# Patient Record
Sex: FEMALE | Race: WHITE | NOT HISPANIC OR LATINO | Employment: STUDENT | URBAN - METROPOLITAN AREA
[De-identification: names, ages, dates, MRNs, and addresses within clinical notes are randomized per-mention and may not be internally consistent; named-entity substitution may affect disease eponyms.]

---
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
## 2021-03-27 PROCEDURE — 99283 EMERGENCY DEPT VISIT LOW MDM: CPT

## 2021-03-28 ENCOUNTER — APPOINTMENT (EMERGENCY)
Dept: RADIOLOGY | Facility: HOSPITAL | Age: 21
End: 2021-03-28
Attending: EMERGENCY MEDICINE
Payer: COMMERCIAL

## 2021-03-28 ENCOUNTER — HOSPITAL ENCOUNTER (EMERGENCY)
Facility: HOSPITAL | Age: 21
Discharge: HOME | End: 2021-03-28
Attending: EMERGENCY MEDICINE | Admitting: EMERGENCY MEDICINE
Payer: COMMERCIAL

## 2021-03-28 VITALS
WEIGHT: 119 LBS | DIASTOLIC BLOOD PRESSURE: 83 MMHG | HEART RATE: 112 BPM | SYSTOLIC BLOOD PRESSURE: 134 MMHG | TEMPERATURE: 97.7 F | OXYGEN SATURATION: 97 % | RESPIRATION RATE: 18 BRPM

## 2021-03-28 DIAGNOSIS — S93.409A ANKLE SPRAIN: Primary | ICD-10-CM

## 2021-03-28 PROCEDURE — 73630 X-RAY EXAM OF FOOT: CPT

## 2021-03-28 PROCEDURE — 73610 X-RAY EXAM OF ANKLE: CPT

## 2021-03-28 PROCEDURE — 99282 EMERGENCY DEPT VISIT SF MDM: CPT | Performed by: EMERGENCY MEDICINE

## 2021-03-28 RX ORDER — IBUPROFEN 600 MG/1
600 TABLET ORAL ONCE
Status: DISCONTINUED | OUTPATIENT
Start: 2021-03-28 | End: 2021-03-28 | Stop reason: HOSPADM

## 2021-03-28 NOTE — ED PROVIDER NOTES
History  Chief Complaint   Patient presents with    Ankle Injury     Patient c/o b/l ankle and foot swelling and pain from fall  Patient fell down 3 steps  Mechanical fall  No head injury or LOC  HPI    23 yo female who presents with ankle injury  Patient states she tripped and fell and has pain in both ankles worse on left than right  Patient denies any foot pain  Patient denies hitting head no loss of conscious  23 yo female who presents with bilateral ankle pain  Will get x-rays    Prior to Admission Medications   Prescriptions Last Dose Informant Patient Reported? Taking? Norethin Ace-Eth Estrad-FE (BLISOVI 24 FE PO)   Yes No   Sig: Take by mouth      Facility-Administered Medications: None       Past Medical History:   Diagnosis Date    ADHD        History reviewed  No pertinent surgical history  History reviewed  No pertinent family history  I have reviewed and agree with the history as documented  E-Cigarette/Vaping    E-Cigarette Use Never User      E-Cigarette/Vaping Substances     Social History     Tobacco Use    Smoking status: Never Smoker    Smokeless tobacco: Never Used   Substance Use Topics    Alcohol use: Yes     Comment: occasional    Drug use: Never       Review of Systems   Constitutional: Negative  Negative for diaphoresis and fever  HENT: Negative  Respiratory: Negative  Negative for cough, shortness of breath and wheezing  Cardiovascular: Negative  Negative for chest pain, palpitations and leg swelling  Gastrointestinal: Negative for abdominal distention, abdominal pain, nausea and vomiting  Genitourinary: Negative  Musculoskeletal:        Bilateral ankle pain   Skin: Negative  Neurological: Negative  Psychiatric/Behavioral: Negative  All other systems reviewed and are negative  Physical Exam  Physical Exam  Vitals signs and nursing note reviewed  Constitutional:       General: She is not in acute distress       Appearance: She is well-developed  HENT:      Head: Normocephalic and atraumatic  Eyes:      Conjunctiva/sclera: Conjunctivae normal    Neck:      Musculoskeletal: Neck supple  Cardiovascular:      Rate and Rhythm: Normal rate and regular rhythm  Heart sounds: No murmur  Pulmonary:      Effort: Pulmonary effort is normal  No respiratory distress  Breath sounds: Normal breath sounds  Abdominal:      Palpations: Abdomen is soft  Tenderness: There is no abdominal tenderness  Musculoskeletal:         General: Swelling present  Comments: Some swelling around the left lateral malleolus  Tenderness to palpation  Normal range of motion  Normal pulses  No pain and the upper leg or knee  Skin:     General: Skin is warm and dry  Neurological:      Mental Status: She is alert  Vital Signs  ED Triage Vitals [03/28/21 0005]   Temperature Pulse Respirations Blood Pressure SpO2   97 7 °F (36 5 °C) (!) 112 18 134/83 97 %      Temp Source Heart Rate Source Patient Position - Orthostatic VS BP Location FiO2 (%)   Tympanic Monitor Sitting Right arm --      Pain Score       Worst Possible Pain           Vitals:    03/28/21 0005   BP: 134/83   Pulse: (!) 112   Patient Position - Orthostatic VS: Sitting         Visual Acuity      ED Medications  Medications - No data to display    Diagnostic Studies  Results Reviewed     None                 XR ankle 3+ views LEFT    (Results Pending)   XR foot 3+ views LEFT    (Results Pending)   XR ankle 3+ views RIGHT    (Results Pending)   XR foot 3+ views RIGHT    (Results Pending)              Procedures  Procedures         ED Course         MO      Most Recent Value   SBIRT (13-21 yo)   In order to provide better care to our patients, we are screening all of our patients for alcohol and drug use  Would it be okay to ask you these screening questions? No Filed at: 03/28/2021 0019                                        MDM    Disposition  Final diagnoses:    Ankle sprain     Time reflects when diagnosis was documented in both MDM as applicable and the Disposition within this note     Time User Action Codes Description Comment    3/28/2021 12:57 AM Nely Pedersen [U57 [id number]] Ankle sprain       ED Disposition     ED Disposition Condition Date/Time Comment    Discharge Stable Sun Mar 28, 2021 12:57 AM Jeff Mena discharge to home/self care  Follow-up Information     Follow up With Specialties Details Why Contact Info    Luis Vaughn   As needed [address]            Discharge Medication List as of 3/28/2021  1:00 AM      CONTINUE these medications which have NOT CHANGED    Details   Norethin Ace-Eth Estrad-FE (BLISOVI 24 FE PO) Take by mouth, Historical Med           No discharge procedures on file      PDMP Review     None          ED Provider  Electronically Signed by           Earl Hastings MD  03/28/21 5004

## 2021-04-13 DIAGNOSIS — Z23 ENCOUNTER FOR IMMUNIZATION: ICD-10-CM

## 2021-04-23 ENCOUNTER — OFFICE VISIT (OUTPATIENT)
Dept: OBGYN CLINIC | Facility: CLINIC | Age: 21
End: 2021-04-23
Payer: COMMERCIAL

## 2021-04-23 VITALS
DIASTOLIC BLOOD PRESSURE: 70 MMHG | HEART RATE: 82 BPM | WEIGHT: 119 LBS | SYSTOLIC BLOOD PRESSURE: 110 MMHG | HEIGHT: 64 IN | BODY MASS INDEX: 20.32 KG/M2

## 2021-04-23 DIAGNOSIS — S93.491A SPRAIN OF ANTERIOR TALOFIBULAR LIGAMENT OF RIGHT ANKLE, INITIAL ENCOUNTER: Primary | ICD-10-CM

## 2021-04-23 DIAGNOSIS — S93.492A SPRAIN OF ANTERIOR TALOFIBULAR LIGAMENT OF LEFT ANKLE, INITIAL ENCOUNTER: ICD-10-CM

## 2021-04-23 PROCEDURE — 99203 OFFICE O/P NEW LOW 30 MIN: CPT | Performed by: ORTHOPAEDIC SURGERY

## 2021-04-23 RX ORDER — DEXTROAMPHETAMINE SACCHARATE, AMPHETAMINE ASPARTATE, DEXTROAMPHETAMINE SULFATE AND AMPHETAMINE SULFATE 5; 5; 5; 5 MG/1; MG/1; MG/1; MG/1
1 TABLET ORAL DAILY
COMMUNITY
Start: 2021-03-03

## 2021-04-23 NOTE — PATIENT INSTRUCTIONS
You have sprained your ankle  Over the next few weeks, it is important to follow these instructions to prevent long term complications from this sprain  1  Begin PT immediately  2  Obtain a compression stocking  Knee high (20-30mm Hg pressure)  Wear this at all times while awake to help control swelling  3  NSAIDs as needed for anti-inflammatory effects  You have sprained your ankle  Over the next 4 weeks it is important you follow these instructions;     Lateral Ankle Sprain Protocol - [facility]  Acute Phase: Days 1-3  Goals: Decrease pain and swelling, protect from further injury  · Pain and swelling management (RICE)  · Protection of injured ligaments (activity modification, supportive sneakers, occasionally a boot is necessary for a week or two at most)  · Gait-WBAT  Sub-Acute Phase: 2-4 days to 2 weeks  Goals: Decrease/eliminate pain, increase ROM, decrease swelling, increase strength  · Continue pain and swelling management  · Subtalar and talocrurcal joint mobilizations  · ROM with pain-free range: DF/PF/EV/IV AROM, calf stretching  · Isometric strengthening  Rehabilitative Phase: 2-6 weeks  Goals: Regain ROM and strength, increase endurance and proprioception  · Continue joint mobilizations and stretching  · Progress to pain-free concentric and eccentric strengthening exercises (both open chain and closed chain)  · Proprioception exercises (balance board, BAPS board, single leg stance etc )  · Gait training-promote equal weight beraing and weaning of assistive devices  · Endurance activities (stationary biking, swimming, walking, etc )  Functional Phase: 6 weeks  Goals: Return to full activity and function  · Continue strengthening exercises  · Coordination and agility training-depends on patient's prior level of function, recreational activities, and goals         Treating your Sprained Ankle  Treating your sprained ankle properly may prevent chronic pain and instability  For a Grade I sprain, follow the R I C E  guidelines:    · Rest your ankle by not walking on it  Limit weight bearing  Use crutches if necessary; if there is no fracture you are safe to put some weight on the leg  An ankle brace often helps control swelling and adds stability while the ligaments are healing  · Ice it to keep down the swelling  Don't put ice directly on the skin (use a thin piece of cloth such as a pillow case between the ice bag and the skin) and don't ice more than 20 minutes at a time to avoid frost bite  · Compression can help control swelling as well as immobilize and support your injury  · Elevate the foot by reclining and propping it up above the waist or heart as needed  Swelling usually goes down with a few days  For a Grade II sprain, follow the R I C E  guidelines and allow more time for healing  A doctor may immobilize or splint your sprained ankle  A Grade III sprain puts you at risk for permanent ankle instability  Rarely, surgery may be needed to repair the damage, especially in competitive athletes  For severe ankle sprains, your doctor may also consider treating you with a short leg cast for two to three weeks or a walking boot  People who sprain their ankle repeatedly may also need surgical repair to tighten their ligaments  Rehabilitating your Sprained Ankle  Every ligament injury needs rehabilitation  Otherwise, your sprained ankle might not heal completely and you might re-injure it  All ankle sprains, from mild to severe, require three phases of recovery:    · Phase I includes resting, protecting and reducing swelling of your injured ankle  · Phase II includes restoring your ankle's flexibility, range of motion and strength  · Phase III includes gradually returning to straight-ahead activity and doing maintenance exercises, followed later by more cutting sports such as tennis, basketball or football     Once you can stand on your ankle again, your doctor will prescribe exercise routines to strengthen your muscles and ligaments and increase your flexibility, balance and coordination  Later, you may walk, jog and run figure eights with your ankle taped or in a supportive ankle brace  It's important to complete the rehabilitation program because it makes it less likely that you'll hurt the same ankle again  If you don't complete rehabilitation, you could suffer chronic pain, instability and arthritis in your ankle  If your ankle still hurts, it could mean that the sprained ligament has not healed right, or that some other injury also happened  To prevent future sprained ankles, pay attention to your body's warning signs to slow down when you feel pain or fatigue, and stay in shape with good muscle balance, flexibility and strength in your soft tissues  Ankle Sprain     What is an ankle sprain? An ankle sprain refers to tearing of the ligaments of the ankle  The most common ankle sprain occurs on the lateral or outside part of the ankle  This is an extremely common injury which affects many people during a wide variety of activities  It can happen in the setting of an ankle fracture (i e  when the bones of the ankle also break)  Most commonly, however, it occurs in isolation  What are the symptoms an ankle sprain? Patients report pain after having twisted an ankle  This usually occurs due to an inversion injury, which means the foot rolls underneath the ankle or leg  It commonly occurs during sports  Patients will complain of pain on the outside of their ankle and various degrees of swelling and bleeding under the skin (i e  bruising)  Technically, this bruising is referred to as ecchymosis  Depending on the severity of the sprain, a person may or may not be able to put weight on the foot  What are the risk factors for an ankle sprain? As noted above, these injuries occur when the ankle is twisted underneath the leg, called inversion   Risk factors are those activities, such as basketball and jumping sports, in which an athlete can come down on and turn the ankle or step on an opponent's foot  Some people are predisposed to ankle sprains  In people with a hindfoot varus, which means that the general nature or posture of the heels is slightly turned toward the inside, these injuries are more common  This is because it is easier to turn on the ankle  In those who have had a severe sprain in the past, it is also easier to turn the ankle and cause a new sprain  Therefore, one of the risk factors of spraining the ankle is having instability  Those who have weak muscles, especially those called the peroneals which run along the outside of the ankle, may be more predisposed  Anatomy    There are multiple ligaments in the ankle  Ligaments in general are those structures that connect bone-to-bone  Tendons, on the other hand, connect muscle-to-bone and allow those muscles to exert their force  In the case of an ankle sprain, there are several commonly sprained ligaments  The two most important are the followin The ATFL or anterior talofibular ligament, which connects the talus to the fibula on the outside of the ankle  2 The CFL or calcaneal fibular ligament, which connects the fibula to the calcaneus below  3  Finally, there is a third ligament which is not as commonly torn  It runs more in the back of the ankle and is called the PTFL or posterior talofibular ligament  These must be differentiated from the so-called high ankle sprain ligaments, which are completely different and located higher up the leg  How is an ankle sprain diagnosed? Ankle sprains can be diagnosed fairly easily given that they are common injuries  The location of pain on the outside of the ankle with tenderness and swelling in a patient who has an ankle with inversion is very suggestive   In these patients, normal X-rays also suggest that the bone has not been broken and instead the ankle ligaments have been torn or sprained  It is very important, however, not to simply regard any injury as an ankle sprain because other injuries can occur as well  For example, the peroneal tendons mentioned above can be torn  There can also be fractures in other bones around the ankle including the fifth metatarsal and the anterior process of the calcaneus  In very severe cases, an MRI may be warranted to rule out other problems in the ankle such as damage to the cartilage  An MRI typically is not necessary to diagnose a sprain  What are treatment options? Surgery is not required in the vast majority of ankle sprains  Even in severe sprains, these ligaments will heal without surgery  The grade of the sprain will dictate treatment  Sprains are traditionally classified into several grades  Perhaps more important, however, is the patients ability to bear weight  Those that can bear weight even after the injury are likely to return very quickly to play  Those who cannot walk may need to be immobilized  In general, treatment in the first 48 to 72 hours consists of resting the ankle, icing 20 minutes every two to three hours, compressing with an ACE wrap, and elevating, which means positioning the leg and ankle so that the toes are above the level of patients nose  Those patients who cannot bear weight are better treated in a removable walking boot until they can comfortably bear weight  Physical therapy is a mainstay  Patients should learn to strengthen the muscles around the ankle, particularly the peroneals  An ankle brace can be used in an athlete until a therapist believes that the ankle is strong enough to return to play without it  Surgery is rarely indicated but may be needed in a patient who has cartilage damage or other related injuries   Ligaments are only repaired or strengthened in cases of chronic instability in which the ligaments have healed but not in a strong fashion  How long is recovery? Recovery depends on the severity of the injury  As noted above, for those minor injuries, people can return to their activities in sports within several days  For very severe sprains, it may take longer and up to several weeks  It should be noted that high ankle sprains take considerably longer to heal      Outcomes for ankle sprains are generally quite good  Most patients heal from an ankle sprain and are able to get back to their normal lives, sports and activities  Some people, however, who do not properly rehab their ankle and have a rather severe sprain may go on to have ankle instability  Chronic instability occurs in patients repeatedly spraining the ankle  Such repeated episodes can be dangerous because they can lead to damage within the ankle  These patients should be identified and considered for repair  Potential Complications    Surgery is rarely needed  As noted above, however, an improperly rehabbed ankle may end up having chronic instability  It is important to address this with either therapy or surgery before further damage occurs to the ankle  Frequently Asked Questions    What is a high ankle sprain and is that different from a regular ankle sprain? A high ankle sprain refers to tearing of the ligaments that connect the tibia to the fibula (this connection is also called the syndesmosis)  These are different and much less common than the standard lateral ankle sprains, meaning those that occur on the side of the ankle  Do ankle sprains ever need to be repaired acutely? Ankle sprains rarely, if ever, needed to be treated with surgery  The vast majority simply need to be treated with rest, ice, compression and elevation followed by physical therapy and temporary bracing  I have sprained my ankle many times  Should I be concerned? Yes  The more you sprain an ankle, the greater the chance that problems will develop   For example, turning the ankle can lead to damage to the cartilage inside the ankle joint  You should see your doctor if this is occurring

## 2021-04-23 NOTE — PROGRESS NOTES
FRANCISCA Lagos  Attending, Orthopaedic Surgery  Foot and [facility]      Assessment:     Encounter Diagnoses   Name Primary?  Sprain of anterior talofibular ligament of left ankle, initial encounter     Sprain of anterior talofibular ligament of right ankle, initial encounter Yes              Plan:     The patient has sustained a sprain of bilateral ankles ATFL and possibly CFL  We will begin physical therapy as soon as the patient tolerates- Order placed  Instructions given for rest, ice 20mins/hr, elevation, and Ace wrap given for compression  The patient was instructed to use supportive pair of shoes      Follow up will be in 4 weeks  Khushbu Riojas MD          Subjective:    Chief Complaint:    Chief Complaint   Patient presents with    Right Ankle - Pain    Left Ankle - Pain        Jeff Mena is a 24 y o  female self-referred for evaluation and treatment of an injury to the bilateral ankle  This is evaluated as a personal injury  The injury occurred 3 weeks ago, and occurred while she fell down some stairs  The patient states the ankle rolled inward at the time of injury  She did not hear or sense a pop or snap at the time of the injury  The patient notes pain and moderate swelling of the ankle since the injury  She has treated the Left ankle with ice, Elevation, Rest, CAM boot and the Right ankle with ice, elevation and an air cast  Pain is localized to the anterolateral  malleolar area  She has not sprained this ankle in the past     Pain/symptom location: both lower legs  Pain/symptom quality: sharp  Pain/symptom severity: moderate  Pain/symptom timing:  Worse during the day when active  Pain/symptom conext:  Worse with activites and work  Pain/symptom modifying factors:  Rest makes better, activities make worse  Pain/symptom associated signs/symptoms: none    Outside reports reviewed: ER records      Foot and Ankle Surgical History:   see below    Past Medical, Surgical and Social History:  Past Medical History:  has a past medical history of ADHD  Problem List: does not have any pertinent problems on file  Past Surgical History:  has no past surgical history on file  Family History: family history is not on file  Social History:  reports that she has never smoked  She has never used smokeless tobacco  She reports current alcohol use  She reports that she does not use drugs  Current Medications: has a current medication list which includes the following prescription(s): amphetamine-dextroamphetamine and norethin ace-eth estrad-fe  Allergies: has No Known Allergies  Review of Systems:  General- denies fever/chills  HEENT- denies hearing loss or sore throat  Eyes- denies eye pain or visual disturbances, denies red eyes  Respiratory- denies cough or SOB  Cardio- denies chest pain or palpitations  GI- denies abdominal pain  Endocrine- denies urinary frequency  Urinary- denies pain with urination  Musculoskeletal- Negative except noted above  Skin- denies rashes or wounds  Neurological- denies dizziness or headache  Psychiatric- denies anxiety or difficulty concentrating    Objective:        /70   Pulse 82   Ht 5' 4" (1 626 m)   Wt 54 kg (119 lb)   BMI 20 43 kg/m²   General/Constitutional: No apparent distress: well-nourished and well developed  Eyes: normal ocular motion  Lymphatic: No appreciable lymphadenopathy  Respiratory: Non-labored breathing  Vascular: No edema, swelling or tenderness, except as noted in detailed exam   Integumentary: No impressive skin lesions present, except as noted in detailed exam   Neuro: No ataxia or abnormal movements  Psych: Normal mood and affect, oriented to person, place and time  MSK: normal other than stated in HPI and exam      Gait:  Normal  The patient can bear weight on the injured extremity  Bilateral Ankle  Proximal Fibula:   no tenderness noted   Edema:    Moderate swelling circumferentially in the ankle   Ecchymosis:   Moderate in lateral ankle   Crepitus  None   Active ROM:  50% of normal    Passive ROM:   75% of normal    Palpation:  Significant tenderness of the anterolateral ligaments   Stability :   No joint laxity  Drawer sign equal to unaffected ankle  Syndosmosis:   syndesmotic ligament IS NOT tender   Sensation:     Intact in all distributions   Pulses:  normal DP and PT pulses   Right ankle with more laxity than left to anterior drawer test    Imaging  X-ray of the Bilateral ankle/foot: 3 views of the ankle reveal a stable mortise joint, moderate soft tissue swelling, and no evidence of fracture  Reviewed by me personally  I personally performed this service    Rodolfo Koch MD

## 2021-05-04 ENCOUNTER — EVALUATION (OUTPATIENT)
Dept: PHYSICAL THERAPY | Facility: REHABILITATION | Age: 21
End: 2021-05-04
Payer: COMMERCIAL

## 2021-05-04 DIAGNOSIS — S93.432D SPRAIN OF TIBIOFIBULAR LIGAMENT OF LEFT ANKLE, SUBSEQUENT ENCOUNTER: ICD-10-CM

## 2021-05-04 DIAGNOSIS — M25.572 ACUTE BILATERAL ANKLE PAIN: Primary | ICD-10-CM

## 2021-05-04 DIAGNOSIS — M25.571 ACUTE BILATERAL ANKLE PAIN: Primary | ICD-10-CM

## 2021-05-04 DIAGNOSIS — S93.431D SPRAIN OF TIBIOFIBULAR LIGAMENT OF RIGHT ANKLE, SUBSEQUENT ENCOUNTER: ICD-10-CM

## 2021-05-04 PROCEDURE — 97140 MANUAL THERAPY 1/> REGIONS: CPT | Performed by: PHYSICAL THERAPIST

## 2021-05-04 PROCEDURE — 97110 THERAPEUTIC EXERCISES: CPT | Performed by: PHYSICAL THERAPIST

## 2021-05-04 PROCEDURE — 97161 PT EVAL LOW COMPLEX 20 MIN: CPT | Performed by: PHYSICAL THERAPIST

## 2021-05-04 NOTE — PROGRESS NOTES
PT Evaluation     Today's date: 2021  Patient name: Chandni Izquierdo  : 2000  MRN: [id number]  Referring provider: Marzena Portillo MD  Dx:   Encounter Diagnosis     ICD-10-CM    1  Acute bilateral ankle pain  M25 571     M25 572    2  Sprain of tibiofibular ligament of left ankle, subsequent encounter  S93 432D    3  Sprain of tibiofibular ligament of right ankle, subsequent encounter  S93 431D        Start Time: 1800  Stop Time: 1850  Total time in clinic (min): 50 minutes    Assessment  Assessment details: Chandni Izquierdo is a 24 y o  female present with:   Acute bilateral ankle pain  (primary encounter diagnosis)  Sprain of tibiofibular ligament of left ankle, subsequent encounter  Sprain of tibiofibular ligament of right ankle, subsequent encounter    Joby Acharya has the above listed impairments and will benefit from skilled Physical Therapist management to improve deficits to return to prior level of function     Impairments: abnormal muscle firing, abnormal or restricted ROM, activity intolerance, impaired physical strength, lacks appropriate home exercise program and pain with function    Symptom irritability: lowUnderstanding of Dx/Px/POC: good   Prognosis: good    Goals  Impairment Goals  - Decrease pain 0/10  - Improve ROM to 20 degrees dorsiflexion  - Increase strength to 5/5 throughout    Functional Goals  - Return to Prior Level of Function  - Increase Functional Status Measure to: 80  - Patient will be independent with HEP  -Patient will be able to return to walking up and down steps without pain    Plan  Patient would benefit from: skilled PT  Planned therapy interventions: joint mobilization, manual therapy, patient education, postural training, activity modification, abdominal trunk stabilization, body mechanics training, flexibility, functional ROM exercises, graded exercise, home exercise program, neuromuscular re-education, strengthening, stretching, therapeutic activities and therapeutic exercise  Frequency: 1x week  Duration in weeks: 6  Plan of Care beginning date: 2021  Plan of Care expiration date: 6/15/2021  Treatment plan discussed with: patient        Subjective Evaluation    History of Present Illness  Mechanism of injury: Kiara Lara reports rolling right ankle in the morning 3/28/21 while wearing platform shoes and then rolling the left later that afternoon walking down steps  ER x-rays negative for fracture  Notes she was wearing CAM boot on left and air cast on right for about 2 5 weeks  Then saw Dr Karen Abel  Notes greatest difficulty with going down steps  Not a recurrent problem   Pain  Current pain ratin  At worst pain ratin  Location: bilateral ankles  Quality: tight and dull ache    Patient Goals  Patient goals for therapy: decreased pain, increased motion and increased strength  Patient goal: Up and down steps without pain        Objective     Tenderness   Left Ankle/Foot   Tenderness in the anterior talofibular ligament and calcaneofibular ligament  Right Ankle/Foot   Tenderness in the anterior talofibular ligament and calcaneofibular ligament  Active Range of Motion   Left Ankle/Foot   Dorsiflexion (ke): 10 degrees   Plantar flexion: 30 degrees with pain  Inversion: 10 degrees   Eversion: 10 degrees     Right Ankle/Foot   Dorsiflexion (ke): 15 degrees   Plantar flexion: 55 degrees   Inversion: 30 degrees   Eversion: 20 degrees     Passive Range of Motion   Left Ankle/Foot    Dorsiflexion (ke): 15 degrees with pain    Right Ankle/Foot    Dorsiflexion (ke): 25 degrees with pain    Joint Play   Left Ankle/Foot  Hypomobile in the distal tibiofibular joint and talocrural joint  Right Ankle/Foot  Hypomobile in the talocrural joint       Strength/Myotome Testing     Left Ankle/Foot   Dorsiflexion: 4-  Plantar flexion: 3-  Inversion: 4-  Eversion: 4-    Right Ankle/Foot   Dorsiflexion: 4-  Plantar flexion: 3-  Inversion: 4  Eversion: 4 Precautions: NA    Daily Treatment Diary    Manual 5/4/2021        MWM distal fib 10' total        MWM talocrural done        Therapeutic-ex                   Ankle TB Ev/DF* 3x15 OTB        Ankle PF* 3x15 GTB                 Bridges nv                                   Flexibility         HS stretch         Gastroc stretch* 3x30"        Soleus Stretch         Quad stretch                           Therapeutic act                  Standing eccentric heel raises         Heel raises nv        Leg Press nv        Band walks eversion biased         Step ups (involved LE up, uninvolved down)                  Step overs         Mini squats w ue support         STS from chair/low mat         Squats w target         Goblet squats                  Neuromuscular RE-ed         BAPS board sitting         FTEO         Tandem         SL         Tandem ambulation         Marches                  SL skaters         SL ball toss         SL on foam         SL skaters on foam                  * = on hep

## 2021-05-10 ENCOUNTER — OFFICE VISIT (OUTPATIENT)
Dept: PHYSICAL THERAPY | Facility: REHABILITATION | Age: 21
End: 2021-05-10
Payer: COMMERCIAL

## 2021-05-10 DIAGNOSIS — S93.432D SPRAIN OF TIBIOFIBULAR LIGAMENT OF LEFT ANKLE, SUBSEQUENT ENCOUNTER: Primary | ICD-10-CM

## 2021-05-10 DIAGNOSIS — M25.572 ACUTE BILATERAL ANKLE PAIN: ICD-10-CM

## 2021-05-10 DIAGNOSIS — S93.431D SPRAIN OF TIBIOFIBULAR LIGAMENT OF RIGHT ANKLE, SUBSEQUENT ENCOUNTER: ICD-10-CM

## 2021-05-10 DIAGNOSIS — M25.571 ACUTE BILATERAL ANKLE PAIN: ICD-10-CM

## 2021-05-10 PROCEDURE — 97112 NEUROMUSCULAR REEDUCATION: CPT | Performed by: PHYSICAL THERAPIST

## 2021-05-10 PROCEDURE — 97110 THERAPEUTIC EXERCISES: CPT | Performed by: PHYSICAL THERAPIST

## 2021-05-10 PROCEDURE — 97140 MANUAL THERAPY 1/> REGIONS: CPT | Performed by: PHYSICAL THERAPIST

## 2021-05-10 NOTE — PROGRESS NOTES
Daily Note     Today's date: 5/10/2021  Patient name: Wilfredo Swain  : 2000  MRN: [id number]  Referring provider: Jessee Orona MD  Dx:   Encounter Diagnosis     ICD-10-CM    1  Sprain of tibiofibular ligament of left ankle, subsequent encounter  S93 432D    2  Sprain of tibiofibular ligament of right ankle, subsequent encounter  S93 431D    3  Acute bilateral ankle pain  M25 571     M25 572        Start Time: 1145  Stop Time: 1230  Total time in clinic (min): 45 minutes    Subjective: Devaughn Santoyo reports that her ankles feel slightly improved  Objective: See treatment diary below      Assessment: Tolerated treatment well  Patient demonstrated fatigue post treatment, exhibited good technique with therapeutic exercises and would benefit from continued PT  Increased challenge noted during SL balance on left side vs right  Plan: Continue per plan of care           Precautions: NA    Daily Treatment Diary    Manual 2021 5/10       MWM distal fib 10' total        Distal fib mob  8' total grd 3       MWM talocrural done        Therapeutic-ex                   Ankle TB Ev/DF* 3x15 OTB BTB 3x15       Ankle PF* 3x15 GTB dc                Bridges nv 3x12                                  Flexibility         HS stretch         Gastroc stretch* 3x30" 3x30"       Soleus Stretch         Quad stretch                           Therapeutic act                  Standing eccentric heel raises         Heel raises* nv 3x10       Leg Press nv nv       Band walks eversion biased         Step ups (involved LE up, uninvolved down)                  Step overs         Mini squats w ue support         STS from chair/low mat         Squats w target         Goblet squats                  Neuromuscular RE-ed         BAPS board sitting         FTEO         Tandem         SL w 12,3,6 reach  3x8 ea       Tandem ambulation         [employer] skaters         SL ball toss         SL on foam         SL skaters on foam                  * = on hep

## 2021-05-17 ENCOUNTER — OFFICE VISIT (OUTPATIENT)
Dept: PHYSICAL THERAPY | Facility: REHABILITATION | Age: 21
End: 2021-05-17
Payer: COMMERCIAL

## 2021-05-17 DIAGNOSIS — S93.431D SPRAIN OF TIBIOFIBULAR LIGAMENT OF RIGHT ANKLE, SUBSEQUENT ENCOUNTER: ICD-10-CM

## 2021-05-17 DIAGNOSIS — M25.572 ACUTE BILATERAL ANKLE PAIN: ICD-10-CM

## 2021-05-17 DIAGNOSIS — M25.571 ACUTE BILATERAL ANKLE PAIN: ICD-10-CM

## 2021-05-17 DIAGNOSIS — S93.432D SPRAIN OF TIBIOFIBULAR LIGAMENT OF LEFT ANKLE, SUBSEQUENT ENCOUNTER: Primary | ICD-10-CM

## 2021-05-17 PROCEDURE — 97140 MANUAL THERAPY 1/> REGIONS: CPT | Performed by: PHYSICAL THERAPIST

## 2021-05-17 PROCEDURE — 97530 THERAPEUTIC ACTIVITIES: CPT | Performed by: PHYSICAL THERAPIST

## 2021-05-17 PROCEDURE — 97110 THERAPEUTIC EXERCISES: CPT | Performed by: PHYSICAL THERAPIST

## 2021-05-17 PROCEDURE — 97112 NEUROMUSCULAR REEDUCATION: CPT | Performed by: PHYSICAL THERAPIST

## 2021-05-17 NOTE — PROGRESS NOTES
Daily Note     Today's date: 2021  Patient name: Jj Ramos  : 2000  MRN: [id number]  Referring provider: Lexi Elliott MD  Dx:   Encounter Diagnosis     ICD-10-CM    1  Sprain of tibiofibular ligament of left ankle, subsequent encounter  S93 432D    2  Sprain of tibiofibular ligament of right ankle, subsequent encounter  S93 431D    3  Acute bilateral ankle pain  M25 571     M25 572        Start Time: 1530  Stop Time: 1620  Total time in clinic (min): 50 minutes    Subjective: Dionte Kahn reports that her ankles continue to feel improved, notes some stiffness due to being more active on them over the weekend during a scavenger hunt  Objective: See treatment diary below      Assessment: Tolerated treatment well  Patient demonstrated fatigue post treatment, exhibited good technique with therapeutic exercises and would benefit from continued PT  Dionte Kahn will be treated for one more session and then will go home for the semester and continue with independent HEP or transfer to another PT facility closer to home as needed  DF left 15* PF 60*  DF right 10*, PF 50* pain  Plan: Continue per plan of care           Precautions: NA    Daily Treatment Diary    Manual 2021 5/10 5/17      MWM distal fib 10' total        Distal fib mob  8' total grd 3 Done 8' total      MWM talocrural done        Therapeutic-ex          Elliptical   5' lvl 3      Ankle TB Ev/DF* 3x15 OTB BTB 3x15 BTB 3x15      Ankle PF* 3x15 GTB dc                Bridges nv 3x12 3x15               Flexibility         HS stretch         Gastroc stretch* 3x30" 3x30" 3x30"      Soleus Stretch         Quad stretch                           Therapeutic act                  Standing eccentric heel raises         Heel raises* nv 3x10 3x12               Heel walks*   3 laps      Toe Walks*   4 laps      Step ups (involved LE up, uninvolved down)                  Step overs   4" 2x10 ea      Squats w target         Goblet squats   10# 3x10               Neuromuscular RE-ed         BAPS board sitting         FTEO         Tandem         SL w 12,3,6 reach  3x8 ea 3x10 ea      Tandem ambulation         [employer] skaters         SL ball toss         SL on foam         SL skaters on foam                  * = on hep

## 2021-05-24 ENCOUNTER — OFFICE VISIT (OUTPATIENT)
Dept: PHYSICAL THERAPY | Facility: REHABILITATION | Age: 21
End: 2021-05-24
Payer: COMMERCIAL

## 2021-05-24 DIAGNOSIS — S93.432D SPRAIN OF TIBIOFIBULAR LIGAMENT OF LEFT ANKLE, SUBSEQUENT ENCOUNTER: Primary | ICD-10-CM

## 2021-05-24 DIAGNOSIS — M25.571 ACUTE BILATERAL ANKLE PAIN: ICD-10-CM

## 2021-05-24 DIAGNOSIS — S93.431D SPRAIN OF TIBIOFIBULAR LIGAMENT OF RIGHT ANKLE, SUBSEQUENT ENCOUNTER: ICD-10-CM

## 2021-05-24 DIAGNOSIS — M25.572 ACUTE BILATERAL ANKLE PAIN: ICD-10-CM

## 2021-05-24 PROCEDURE — 97110 THERAPEUTIC EXERCISES: CPT | Performed by: PHYSICAL THERAPIST

## 2021-05-24 PROCEDURE — 97112 NEUROMUSCULAR REEDUCATION: CPT | Performed by: PHYSICAL THERAPIST

## 2021-05-24 PROCEDURE — 97530 THERAPEUTIC ACTIVITIES: CPT | Performed by: PHYSICAL THERAPIST

## 2021-05-24 NOTE — PROGRESS NOTES
Daily Note     Today's date: 2021  Patient name: Suzan Rubio  : 2000  MRN: [id number]  Referring provider: John Kiran MD  Dx:   Encounter Diagnosis     ICD-10-CM    1  Sprain of tibiofibular ligament of left ankle, subsequent encounter  S93 432D    2  Sprain of tibiofibular ligament of right ankle, subsequent encounter  S93 431D    3  Acute bilateral ankle pain  M25 571     M25 572        Start Time: 930  Stop Time: 1015  Total time in clinic (min): 45 minutes    Subjective: Jamie Duran reports that her ankles have been feeling good  Notes she will be going home for the semester after this week and will be discharging from PT  Objective: See treatment diary below      Assessment: Tolerated treatment well  Patient demonstrated fatigue post treatment, exhibited good technique with therapeutic exercises and would benefit from continued PT  Candido Toth to continue with her exercises and join a gym back home to continue progressing her strength if able  Provided updated HEP answered all questions  At this time will be placed on hold until expiration of POC  Plan: Continue per plan of care           Precautions: NA    Daily Treatment Diary    Manual 2021 5/10 5/17 5/24     MWM distal fib 10' total        Distal fib mob  8' total grd 3 Done 8' total np     MWM talocrural done        Therapeutic-ex          Elliptical   5' lvl 3 5' lvl 3     Ankle TB Ev/DF* 3x15 OTB BTB 3x15 BTB 3x15 BTB 3x15     Ankle PF* 3x15 GTB dc                Bridges nv 3x12 3x15 np     Bridge w march    3x8     Flexibility         HS stretch         Gastroc stretch* 3x30" 3x30" 3x30" 3x30"     Soleus Stretch         Quad stretch                           Therapeutic act                  Standing eccentric heel raises         Heel raises* nv 3x10 3x12 3x15              Heel walks*   3 laps 4 laps 10# ea     Toe Walks*   4 laps 4 laps 10# ea     Step ups (involved LE up, uninvolved down)                  Step overs   4" 2x10 ea 4" 2x10 ea     Squats w target         Goblet squats   10# 3x10 15# 3x10              Neuromuscular RE-ed         BAPS board sitting         FTEO         Tandem         SL w 12,3,6 reach  3x8 ea 3x10 ea 3x12ea     Tandem ambulation         [employer] skaters         SL ball toss         SL on foam         SL skaters on foam                  * = on hep

## 2021-05-25 ENCOUNTER — OFFICE VISIT (OUTPATIENT)
Dept: OBGYN CLINIC | Facility: CLINIC | Age: 21
End: 2021-05-25
Payer: COMMERCIAL

## 2021-05-25 VITALS
HEIGHT: 64 IN | BODY MASS INDEX: 20.32 KG/M2 | DIASTOLIC BLOOD PRESSURE: 87 MMHG | SYSTOLIC BLOOD PRESSURE: 130 MMHG | HEART RATE: 89 BPM | WEIGHT: 119 LBS

## 2021-05-25 DIAGNOSIS — S93.492A SPRAIN OF ANTERIOR TALOFIBULAR LIGAMENT OF LEFT ANKLE, INITIAL ENCOUNTER: Primary | ICD-10-CM

## 2021-05-25 DIAGNOSIS — S93.491A SPRAIN OF ANTERIOR TALOFIBULAR LIGAMENT OF RIGHT ANKLE, INITIAL ENCOUNTER: ICD-10-CM

## 2021-05-25 PROCEDURE — 99213 OFFICE O/P EST LOW 20 MIN: CPT | Performed by: ORTHOPAEDIC SURGERY

## 2021-05-25 NOTE — PROGRESS NOTES
FRANCISCA Charlton  Attending, Orthopaedic Surgery  Foot and [facility] [address] [facility]      ORTHOPAEDIC FOOT AND ANKLE CLINIC VISIT     Assessment:     Encounter Diagnoses   Name Primary?  Sprain of anterior talofibular ligament of left ankle, initial encounter Yes    Sprain of anterior talofibular ligament of right ankle, initial encounter             Plan:   · The patient verbalized understanding of exam findings and treatment plan  We engaged in the shared decision-making process and treatment options were discussed at length with the patient  Surgical and conservative management discussed today along with risks and benefits  · José Manuel has significant improvement in her bilateral ankles  With 75% improvement in the left ankle and 90% improvement in the right ankle  · Return to all normal activities  · Continue HEP with [facility] when returned home  · Continue ice/elvation as needed for pain ans swelling  Return if symptoms worsen or fail to improve  History of Present Illness:   Chief Complaint: bilateral ankle follow up     Dayron Arteaga is a 24 y o  female who is being seen in follow-up for left and right tibiofibular ligament sprain  When we last saw she we recommended Pt for range of motion and strengthening, compression stocking for swelling, and ice/elevation as needed for pain and swelling  Pain has  improved  No TTP      Pain/symptom timing:  Worse during the day when active  Pain/symptom context:  Worse with activites and work  Pain/symptom modifying factors:  Rest makes better, activities make worse  Pain/symptom associated signs/symptoms: none    Prior treatment   · NSAIDsYes   · Injections No   · Bracing/Orthotics No    · Physical Therapy Yes     Orthopedic Surgical History:   See Below    Past Medical, Surgical and Social History:  Past Medical History:  has a past medical history of ADHD  Problem List: does not have any pertinent problems on file    Past Surgical History:  has no past surgical history on file  Family History: family history is not on file  Social History:  reports that she has never smoked  She has never used smokeless tobacco  She reports current alcohol use  She reports that she does not use drugs  Current Medications: has a current medication list which includes the following prescription(s): amphetamine-dextroamphetamine and norethin ace-eth estrad-fe  Allergies: has No Known Allergies  Review of Systems:  General- denies fever/chills  HEENT- denies hearing loss or sore throat  Eyes- denies eye pain or visual disturbances, denies red eyes  Respiratory- denies cough or SOB  Cardio- denies chest pain or palpitations  GI- denies abdominal pain  Endocrine- denies urinary frequency  Urinary- denies pain with urination  Musculoskeletal- Negative except noted above  Skin- denies rashes or wounds  Neurological- denies dizziness or headache  Psychiatric- denies anxiety or difficulty concentrating    Physical Exam:   /87   Pulse 89   Ht 5' 4" (1 626 m)   Wt 54 kg (119 lb)   BMI 20 43 kg/m²   General/Constitutional: No apparent distress: well-nourished and well developed  Eyes: normal ocular motion  Lymphatic: No appreciable lymphadenopathy  Respiratory: Non-labored breathing  Vascular: No edema, swelling or tenderness, except as noted in detailed exam   Integumentary: No impressive skin lesions present, except as noted in detailed exam   Neuro: No ataxia or tremors noted  Psych: Normal mood and affect, oriented to person, place and time  Appropriate affect  Musculoskeletal: Normal, except as noted in detailed exam and in HPI      Examination    Left    Gait Normal   Musculoskeletal No TTP    Skin Normal      Nails Normal    Range of Motion  15 degrees dorsiflexion, 45 degrees plantarflexion  Subtalar motion: normal    Stability Stable    Muscle Strength 5/5 tibialis anterior  5/5 gastrocnemius-soleus  5/5 posterior tibialis  5/5 peroneal/eversion strength  5/5 EHL  5/5 FHL    Neurologic Normal    Sensation  Intact to light touch throughout sural, saphenous, superficial peroneal, deep peroneal and medial/lateral plantar nerve distributions  [place] 07 filament (10g) testing  deferred  Cardiovascular Brisk capillary refill < 2 seconds,intact DP and PT pulses    Special Tests None      Imaging Studies:   No new imaging        James R Lachman, MD  Foot & Ankle Surgery   Department of [address] [place] [address]      I personally performed the service  Celedonio Lemmings Lachman, MD    Scribe Attestation    I,:  Anais Carrasco MA am acting as a scribe while in the presence of the attending physician :       I,:  Hudson Maya MD personally performed the services described in this documentation    as scribed in my presence :

## 2021-06-18 NOTE — PROGRESS NOTES
PT Discharge    Today's date: 2021  Patient name: Becca Soliz  : 2000  MRN: [id number]  Referring provider: Angi Long MD  Dx:   Encounter Diagnosis     ICD-10-CM    1  Sprain of tibiofibular ligament of left ankle, subsequent encounter  S93 432D    2  Sprain of tibiofibular ligament of right ankle, subsequent encounter  S93 431D    3  Acute bilateral ankle pain  M25 571     M25 572        Start Time: 930  Stop Time: 1015  Total time in clinic (min): 45 minutes    Assessment/Plan  Becca Soliz has not returned since last appointment, unable to perform objective measures since then  It is assumed they have returned to prior level of function and do not desire additional physical therapy  At this time, Becca Soliz will be discharged from physical therapy services      Subjective    Objective    Flowsheet Rows      Most Recent Value   PT/OT G-Codes   Current Score  77   Projected Score  85

## 2022-05-06 ENCOUNTER — APPOINTMENT (EMERGENCY)
Dept: RADIOLOGY | Facility: HOSPITAL | Age: 22
End: 2022-05-06
Payer: COMMERCIAL

## 2022-05-06 ENCOUNTER — HOSPITAL ENCOUNTER (EMERGENCY)
Facility: HOSPITAL | Age: 22
Discharge: HOME | End: 2022-05-06
Attending: EMERGENCY MEDICINE | Admitting: EMERGENCY MEDICINE
Payer: COMMERCIAL

## 2022-05-06 VITALS
HEART RATE: 84 BPM | TEMPERATURE: 96.8 F | SYSTOLIC BLOOD PRESSURE: 122 MMHG | DIASTOLIC BLOOD PRESSURE: 72 MMHG | BODY MASS INDEX: 21.46 KG/M2 | OXYGEN SATURATION: 98 % | WEIGHT: 125 LBS | RESPIRATION RATE: 18 BRPM

## 2022-05-06 DIAGNOSIS — S06.0X9A CONCUSSION: ICD-10-CM

## 2022-05-06 DIAGNOSIS — F10.929 ALCOHOL INTOXICATION (HCC): Primary | ICD-10-CM

## 2022-05-06 PROCEDURE — 70486 CT MAXILLOFACIAL W/O DYE: CPT

## 2022-05-06 PROCEDURE — 99284 EMERGENCY DEPT VISIT MOD MDM: CPT | Performed by: EMERGENCY MEDICINE

## 2022-05-06 PROCEDURE — G1004 CDSM NDSC: HCPCS

## 2022-05-06 PROCEDURE — 90471 IMMUNIZATION ADMIN: CPT

## 2022-05-06 PROCEDURE — 70450 CT HEAD/BRAIN W/O DYE: CPT

## 2022-05-06 PROCEDURE — 99284 EMERGENCY DEPT VISIT MOD MDM: CPT

## 2022-05-06 PROCEDURE — 90715 TDAP VACCINE 7 YRS/> IM: CPT | Performed by: EMERGENCY MEDICINE

## 2022-05-06 RX ADMIN — TETANUS TOXOID, REDUCED DIPHTHERIA TOXOID AND ACELLULAR PERTUSSIS VACCINE, ADSORBED 0.5 ML: 5; 2.5; 8; 8; 2.5 SUSPENSION INTRAMUSCULAR at 02:39

## 2022-05-06 NOTE — DISCHARGE INSTRUCTIONS
You have been seen for alcohol intoxication and a concussion  You should return to the ED if you develop mental status changes, inability to be woken up, or other worsening symptoms  Follow up with your primary care physician and the Comprehensive concussion program   Take Tylenol or Motrin for headaches  You may experience nausea and dizziness for the next few days or weeks

## 2022-05-06 NOTE — Clinical Note
Cherie Banda was seen and treated in our emergency department on 5/6/2022  Diagnosis:     Makenzie Walden  may return to school on return date  She may return on this date: 05/09/2022         If you have any questions or concerns, please don't hesitate to call        Lindsay Lance DO    ______________________________           _______________          _______________  Hospital Representative                              Date                                Time
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
## 2022-05-06 NOTE — ED PROVIDER NOTES
History  Chief Complaint   Patient presents with    Head Injury     hit pavement while walking and is now complaining of head pain    Alcohol Intoxication     46-year-old female with no significant past medical history presents with alcohol intoxication and head injury  Patient reports having multiple alcoholic beverages while out tonight  She had a slip and fall and hit her head on the ground  Patient has a small hematoma over her right eyebrow  She states that her head hurt initially, but it is not anymore  She has taken no pain medications  Patient reports that she did not use any other illicit substances tonight  Patient has abrasions to her face  She states that her tetanus is not up-to-date  She has walked around since falling and reports no other pain or injuries  Patient's parents are present and patient plans to go home with them so that they can observe her  Prior to Admission Medications   Prescriptions Last Dose Informant Patient Reported? Taking? Norethin Ace-Eth Estrad-FE (BLISOVI 24 FE PO)   Yes No   Sig: Take by mouth   amphetamine-dextroamphetamine (ADDERALL) 20 mg tablet   Yes No   Sig: Take 1 tablet by mouth daily      Facility-Administered Medications: None       Past Medical History:   Diagnosis Date    ADHD        History reviewed  No pertinent surgical history  History reviewed  No pertinent family history  I have reviewed and agree with the history as documented  E-Cigarette/Vaping    E-Cigarette Use Never User      E-Cigarette/Vaping Substances    Nicotine No     THC No     CBD No     Flavoring No     Other No     Unknown No      Social History     Tobacco Use    Smoking status: Never Smoker    Smokeless tobacco: Never Used   Vaping Use    Vaping Use: Never used   Substance Use Topics    Alcohol use: Yes     Comment: occasional    Drug use: Never        Review of Systems   Constitutional: Negative for chills, fatigue and fever     HENT: Negative for congestion, rhinorrhea and sore throat  Eyes: Negative for pain and redness  Respiratory: Negative for cough, chest tightness, shortness of breath and wheezing  Cardiovascular: Negative for chest pain and palpitations  Gastrointestinal: Negative for abdominal pain, diarrhea, nausea and vomiting  Endocrine: Negative  Genitourinary: Negative for difficulty urinating and hematuria  Musculoskeletal: Negative for back pain and myalgias  Skin: Negative for pallor and rash  Allergic/Immunologic: Negative  Neurological: Positive for headaches  Negative for dizziness, weakness and light-headedness  Hematological: Negative  Physical Exam  ED Triage Vitals   Temperature Pulse Respirations Blood Pressure SpO2   05/06/22 0050 05/06/22 0051 05/06/22 0051 05/06/22 0051 05/06/22 0051   (!) 96 8 °F (36 °C) 92 18 128/100 99 %      Temp Source Heart Rate Source Patient Position - Orthostatic VS BP Location FiO2 (%)   05/06/22 0050 05/06/22 0051 05/06/22 0330 05/06/22 0330 --   Tympanic Monitor Sitting Right arm       Pain Score       --                    Orthostatic Vital Signs  Vitals:    05/06/22 0051 05/06/22 0330   BP: 128/100 122/72   Pulse: 92 84   Patient Position - Orthostatic VS:  Sitting       Physical Exam  Vitals and nursing note reviewed  Constitutional:       General: She is not in acute distress  Appearance: Normal appearance  She is not ill-appearing  Comments: Intoxicated   HENT:      Head: Normocephalic  Comments: 2 cm hematoma above the right eyebrow  Abrasion to the nose  Eyes:      Conjunctiva/sclera: Conjunctivae normal    Cardiovascular:      Rate and Rhythm: Normal rate and regular rhythm  Heart sounds: No murmur heard  Pulmonary:      Effort: Pulmonary effort is normal  No respiratory distress  Breath sounds: Normal breath sounds  No wheezing, rhonchi or rales  Abdominal:      General: Abdomen is flat  There is no distension        Palpations: Abdomen is soft  Tenderness: There is no abdominal tenderness  Musculoskeletal:         General: Normal range of motion  Cervical back: Normal range of motion and neck supple  Skin:     General: Skin is warm and dry  Neurological:      General: No focal deficit present  Mental Status: She is alert and oriented to person, place, and time  Cranial Nerves: No cranial nerve deficit  Motor: No weakness  ED Medications  Medications   tetanus-diphtheria-acellular pertussis (BOOSTRIX) IM injection 0 5 mL (0 5 mL Intramuscular Given 5/6/22 0239)       Diagnostic Studies  Results Reviewed     None                 CT head without contrast   Final Result by Chris Rosales MD (05/06 0659)      No acute intracranial abnormality  Workstation performed: [id number]         CT facial bones without contrast   Final Result by Chris Rosales MD (05/06 9337)      No acute maxillofacial fracture  No orbital hematoma  Workstation performed: [id number]               Procedures  Procedures      ED Course                                       MDM  Number of Diagnoses or Management Options  Alcohol intoxication ([facility] [address] ): established and improving  Concussion: established and improving  Diagnosis management comments: 64-year-old female presents with fall while intoxicated  Patient's exam is normal   Will update tetanus  Will CT facial bones and head  Patient will go home with her parents  They can observe her         Amount and/or Complexity of Data Reviewed  Tests in the radiology section of CPT®: ordered and reviewed  Review and summarize past medical records: yes  Discuss the patient with other providers: yes    Risk of Complications, Morbidity, and/or Mortality  Presenting problems: low  Diagnostic procedures: low  Management options: low    Patient Progress  Patient progress: improved    Recommend follow up with primary care physician and comprehensive concussion program  Return precautions given  All questions answered  Disposition  Final diagnoses:   Alcohol intoxication ([facility] [address] )   Concussion     Time reflects when diagnosis was documented in both MDM as applicable and the Disposition within this note     Time User Action Codes Description Comment    5/6/2022  4:00 AM Brittany Pedersen [F10 [id number]] Alcohol intoxication ([facility] [address] )     5/6/2022  4:00 AM Brittany Pedersen [S06 [id number]] Concussion       ED Disposition     ED Disposition Condition Date/Time Comment    Discharge Good Fri May 6, 2022  4:00 AM Kristina Berger discharge to home/self care  Follow-up Information     Follow up With Specialties Details Why Contact Info    Reese Fournierestelle    [address]            Discharge Medication List as of 5/6/2022  4:01 AM      CONTINUE these medications which have NOT CHANGED    Details   amphetamine-dextroamphetamine (ADDERALL) 20 mg tablet Take 1 tablet by mouth daily, Starting Wed 3/3/2021, Historical Med      Norethin Ace-Eth Estrad-FE (BLISOVI 24 FE PO) Take by mouth, Historical Med               PDMP Review     None           ED Provider  Attending physically available and evaluated Kristina Berger I managed the patient along with the ED Attending      Electronically Signed by         Oscar De La Garza DO  05/07/22 7166

## 2022-05-06 NOTE — ED NOTES
ED Attending aware of chief complaint, ok to see in department per doctor       Freddy Oropeza RN  05/06/22 0621

## 2023-03-27 ENCOUNTER — HOSPITAL ENCOUNTER (EMERGENCY)
Facility: HOSPITAL | Age: 23
Discharge: HOME | End: 2023-03-27
Attending: EMERGENCY MEDICINE

## 2023-03-27 ENCOUNTER — APPOINTMENT (EMERGENCY)
Dept: CT IMAGING | Facility: HOSPITAL | Age: 23
End: 2023-03-27

## 2023-03-27 VITALS
BODY MASS INDEX: 20.14 KG/M2 | SYSTOLIC BLOOD PRESSURE: 138 MMHG | TEMPERATURE: 98.2 F | WEIGHT: 118 LBS | RESPIRATION RATE: 18 BRPM | HEART RATE: 88 BPM | OXYGEN SATURATION: 98 % | DIASTOLIC BLOOD PRESSURE: 88 MMHG | HEIGHT: 64 IN

## 2023-03-27 DIAGNOSIS — N10 ACUTE PYELONEPHRITIS: Primary | ICD-10-CM

## 2023-03-27 LAB
ALBUMIN SERPL BCP-MCNC: 4.3 G/DL (ref 3.5–5)
ALP SERPL-CCNC: 55 U/L (ref 34–104)
ALT SERPL W P-5'-P-CCNC: 12 U/L (ref 7–52)
ANION GAP SERPL CALCULATED.3IONS-SCNC: 8 MMOL/L (ref 4–13)
AST SERPL W P-5'-P-CCNC: 13 U/L (ref 13–39)
BACTERIA UR QL AUTO: ABNORMAL /HPF
BASOPHILS # BLD AUTO: 0.04 THOUSANDS/ÂΜL (ref 0–0.1)
BASOPHILS NFR BLD AUTO: 0 % (ref 0–1)
BILIRUB SERPL-MCNC: 0.54 MG/DL (ref 0.2–1)
BILIRUB UR QL STRIP: NEGATIVE
BUN SERPL-MCNC: 14 MG/DL (ref 5–25)
CALCIUM SERPL-MCNC: 9.4 MG/DL (ref 8.4–10.2)
CHLORIDE SERPL-SCNC: 104 MMOL/L (ref 96–108)
CLARITY UR: ABNORMAL
CO2 SERPL-SCNC: 25 MMOL/L (ref 21–32)
COLOR UR: YELLOW
CREAT SERPL-MCNC: 0.67 MG/DL (ref 0.6–1.3)
EOSINOPHIL # BLD AUTO: 0.13 THOUSAND/ÂΜL (ref 0–0.61)
EOSINOPHIL NFR BLD AUTO: 1 % (ref 0–6)
ERYTHROCYTE [DISTWIDTH] IN BLOOD BY AUTOMATED COUNT: 11.9 % (ref 11.6–15.1)
EXT PREGNANCY TEST URINE: NEGATIVE
EXT. CONTROL: NORMAL
GFR SERPL CREATININE-BSD FRML MDRD: 126 ML/MIN/1.73SQ M
GLUCOSE SERPL-MCNC: 83 MG/DL (ref 65–140)
GLUCOSE UR STRIP-MCNC: NEGATIVE MG/DL
HCT VFR BLD AUTO: 42.5 % (ref 34.8–46.1)
HGB BLD-MCNC: 14.3 G/DL (ref 11.5–15.4)
HGB UR QL STRIP.AUTO: ABNORMAL
IMM GRANULOCYTES # BLD AUTO: 0.07 THOUSAND/UL (ref 0–0.2)
IMM GRANULOCYTES NFR BLD AUTO: 1 % (ref 0–2)
KETONES UR STRIP-MCNC: NEGATIVE MG/DL
LEUKOCYTE ESTERASE UR QL STRIP: ABNORMAL
LIPASE SERPL-CCNC: 17 U/L (ref 11–82)
LYMPHOCYTES # BLD AUTO: 2.01 THOUSANDS/ÂΜL (ref 0.6–4.47)
LYMPHOCYTES NFR BLD AUTO: 14 % (ref 14–44)
MCH RBC QN AUTO: 30 PG (ref 26.8–34.3)
MCHC RBC AUTO-ENTMCNC: 33.6 G/DL (ref 31.4–37.4)
MCV RBC AUTO: 89 FL (ref 82–98)
MONOCYTES # BLD AUTO: 0.89 THOUSAND/ÂΜL (ref 0.17–1.22)
MONOCYTES NFR BLD AUTO: 6 % (ref 4–12)
MUCOUS THREADS UR QL AUTO: ABNORMAL
NEUTROPHILS # BLD AUTO: 11.18 THOUSANDS/ÂΜL (ref 1.85–7.62)
NEUTS SEG NFR BLD AUTO: 78 % (ref 43–75)
NITRITE UR QL STRIP: NEGATIVE
NON-SQ EPI CELLS URNS QL MICRO: ABNORMAL /HPF
NRBC BLD AUTO-RTO: 0 /100 WBCS
PH UR STRIP.AUTO: 6 [PH]
PLATELET # BLD AUTO: 329 THOUSANDS/UL (ref 149–390)
PMV BLD AUTO: 10.4 FL (ref 8.9–12.7)
POTASSIUM SERPL-SCNC: 3.5 MMOL/L (ref 3.5–5.3)
PROT SERPL-MCNC: 7 G/DL (ref 6.4–8.4)
PROT UR STRIP-MCNC: ABNORMAL MG/DL
RBC # BLD AUTO: 4.76 MILLION/UL (ref 3.81–5.12)
RBC #/AREA URNS AUTO: ABNORMAL /HPF
SODIUM SERPL-SCNC: 137 MMOL/L (ref 135–147)
SP GR UR STRIP.AUTO: 1.02 (ref 1–1.03)
UROBILINOGEN UR STRIP-ACNC: <2 MG/DL
WBC # BLD AUTO: 14.32 THOUSAND/UL (ref 4.31–10.16)
WBC #/AREA URNS AUTO: ABNORMAL /HPF

## 2023-03-27 RX ORDER — ONDANSETRON 2 MG/ML
4 INJECTION INTRAMUSCULAR; INTRAVENOUS ONCE
Status: COMPLETED | OUTPATIENT
Start: 2023-03-27 | End: 2023-03-27

## 2023-03-27 RX ORDER — ONDANSETRON 4 MG/1
4 TABLET, ORALLY DISINTEGRATING ORAL EVERY 6 HOURS PRN
Qty: 10 TABLET | Refills: 0 | Status: SHIPPED | OUTPATIENT
Start: 2023-03-27

## 2023-03-27 RX ORDER — ACETAMINOPHEN 325 MG/1
975 TABLET ORAL ONCE
Status: COMPLETED | OUTPATIENT
Start: 2023-03-27 | End: 2023-03-27

## 2023-03-27 RX ORDER — KETOROLAC TROMETHAMINE 30 MG/ML
15 INJECTION, SOLUTION INTRAMUSCULAR; INTRAVENOUS ONCE
Status: COMPLETED | OUTPATIENT
Start: 2023-03-27 | End: 2023-03-27

## 2023-03-27 RX ORDER — CEFPODOXIME PROXETIL 200 MG/1
200 TABLET, FILM COATED ORAL 2 TIMES DAILY
Qty: 18 TABLET | Refills: 0 | Status: SHIPPED | OUTPATIENT
Start: 2023-03-27 | End: 2023-04-05

## 2023-03-27 RX ORDER — OXYCODONE HYDROCHLORIDE 5 MG/1
5 TABLET ORAL EVERY 6 HOURS PRN
Qty: 5 TABLET | Refills: 0 | Status: SHIPPED | OUTPATIENT
Start: 2023-03-27

## 2023-03-27 RX ORDER — OXYCODONE HYDROCHLORIDE 5 MG/1
5 TABLET ORAL ONCE
Status: COMPLETED | OUTPATIENT
Start: 2023-03-27 | End: 2023-03-27

## 2023-03-27 RX ADMIN — KETOROLAC TROMETHAMINE 15 MG: 30 INJECTION, SOLUTION INTRAMUSCULAR at 11:49

## 2023-03-27 RX ADMIN — OXYCODONE HYDROCHLORIDE 5 MG: 5 TABLET ORAL at 14:21

## 2023-03-27 RX ADMIN — ACETAMINOPHEN 975 MG: 325 TABLET ORAL at 14:21

## 2023-03-27 RX ADMIN — SODIUM CHLORIDE 1000 ML: 0.9 INJECTION, SOLUTION INTRAVENOUS at 11:50

## 2023-03-27 RX ADMIN — ONDANSETRON 4 MG: 2 INJECTION INTRAMUSCULAR; INTRAVENOUS at 11:49

## 2023-03-27 RX ADMIN — CEFTRIAXONE SODIUM 1000 MG: 10 INJECTION, POWDER, FOR SOLUTION INTRAVENOUS at 12:54

## 2023-03-27 RX ADMIN — IOHEXOL 85 ML: 350 INJECTION, SOLUTION INTRAVENOUS at 13:03

## 2023-03-27 NOTE — ED PROCEDURE NOTE
Procedure  POC Renal US    Date/Time: 3/27/2023 12:46 PM  Performed by: Madelyn Fernandez MD  Authorized by: Madelyn Fernandez MD     Patient location:  ED  Procedure details:     Exam Type:  Diagnostic    Indications: flank/back pain      Assessment for:  Suspected hydronephrosis    Views obtained: bladder (transverse and sagittal) and right kidney      Image quality: diagnostic      Image availability:  Images available in PACS and video obtained  Findings:     RIGHT hydronephrosis: none      Bladder:  Visualized  Interpretation:     Renal ultrasound impressions: normal exam                       Madelyn Fernandez MD  03/27/23 1247

## 2023-03-27 NOTE — DISCHARGE INSTRUCTIONS
Ibuprofen: 600 mg every 6 hours as needed for pain/fever  Acetaminophen: 1000 mg every 6 hours as needed for pain/fever

## 2023-03-27 NOTE — ED PROVIDER NOTES
History  Chief Complaint   Patient presents with   • Flank Pain     Pt arrives c/o throbbing intermittent lateral side pain since yesterday  Started yesterday on L and today is on R  Reports +UA at school doc  Denies fever     60-year-old female coming into the ED for evaluation of right flank pain, with some dysuria and frequency, vomiting that started 2 days ago  She states she started with UTI symptoms which then progressed to left flank pain yesterday, and today right flank pain which she describes as throbbing  She states she had a UTI once before  She is sexually active, taking birth control pills, denies vaginal discharge  History provided by:  Patient   used: No    Flank Pain  Associated symptoms: dysuria, nausea and vomiting        Prior to Admission Medications   Prescriptions Last Dose Informant Patient Reported? Taking? Norethin Ace-Eth Estrad-FE (BLISOVI 24 FE PO)   Yes No   Sig: Take by mouth   amphetamine-dextroamphetamine (ADDERALL) 20 mg tablet   Yes No   Sig: Take 1 tablet by mouth daily      Facility-Administered Medications: None       Past Medical History:   Diagnosis Date   • ADHD        History reviewed  No pertinent surgical history  History reviewed  No pertinent family history  I have reviewed and agree with the history as documented  E-Cigarette/Vaping   • E-Cigarette Use Never User      E-Cigarette/Vaping Substances   • Nicotine No    • THC No    • CBD No    • Flavoring No    • Other No    • Unknown No      Social History     Tobacco Use   • Smoking status: Never   • Smokeless tobacco: Never   Vaping Use   • Vaping Use: Never used   Substance Use Topics   • Alcohol use: Yes     Comment: occasional   • Drug use: Never       Review of Systems   Gastrointestinal: Positive for abdominal pain, nausea and vomiting  Genitourinary: Positive for dysuria and flank pain  All other systems reviewed and are negative        Physical Exam  Physical Exam  Vitals and nursing note reviewed  Constitutional:       General: She is not in acute distress  Appearance: Normal appearance  She is well-developed and normal weight  She is not ill-appearing, toxic-appearing or diaphoretic  HENT:      Head: Normocephalic and atraumatic  Right Ear: External ear normal       Left Ear: External ear normal       Nose: Nose normal       Mouth/Throat:      Mouth: Mucous membranes are moist       Pharynx: Oropharynx is clear  No oropharyngeal exudate or posterior oropharyngeal erythema  Eyes:      Conjunctiva/sclera: Conjunctivae normal    Cardiovascular:      Rate and Rhythm: Normal rate and regular rhythm  Pulses: Normal pulses  Heart sounds: Normal heart sounds  Pulmonary:      Effort: Pulmonary effort is normal       Breath sounds: Normal breath sounds  Abdominal:      General: Abdomen is flat  Bowel sounds are normal  There is no distension or abdominal bruit  There are no signs of injury  Palpations: Abdomen is soft  There is no shifting dullness or mass  Tenderness: There is abdominal tenderness  There is right CVA tenderness  There is no left CVA tenderness, guarding or rebound  Hernia: No hernia is present  Genitourinary:     Adnexa: Right adnexa normal and left adnexa normal    Musculoskeletal:         General: Normal range of motion  Cervical back: Normal range of motion and neck supple  Skin:     General: Skin is warm and dry  Capillary Refill: Capillary refill takes less than 2 seconds  Neurological:      General: No focal deficit present  Mental Status: She is alert and oriented to person, place, and time  Mental status is at baseline     Psychiatric:         Mood and Affect: Mood normal          Behavior: Behavior normal          Vital Signs  ED Triage Vitals [03/27/23 1119]   Temperature Pulse Respirations Blood Pressure SpO2   98 2 °F (36 8 °C) 88 18 138/88 98 %      Temp Source Heart Rate Source Patient Position - Orthostatic VS BP Location FiO2 (%)   Oral Monitor Sitting Right arm --      Pain Score       8           Vitals:    03/27/23 1119   BP: 138/88   Pulse: 88   Patient Position - Orthostatic VS: Sitting         Visual Acuity      ED Medications  Medications   sodium chloride 0 9 % bolus 1,000 mL (0 mL Intravenous Stopped 3/27/23 1404)   ketorolac (TORADOL) injection 15 mg (15 mg Intravenous Given 3/27/23 1149)   ondansetron (ZOFRAN) injection 4 mg (4 mg Intravenous Given 3/27/23 1149)   ceftriaxone (ROCEPHIN) 1 g/50 mL in dextrose IVPB (0 mg Intravenous Stopped 3/27/23 1339)   iohexol (OMNIPAQUE) 350 MG/ML injection (SINGLE-DOSE) 85 mL (85 mL Intravenous Given 3/27/23 1303)   acetaminophen (TYLENOL) tablet 975 mg (975 mg Oral Given 3/27/23 1421)   oxyCODONE (ROXICODONE) IR tablet 5 mg (5 mg Oral Given 3/27/23 1421)       Diagnostic Studies  Results Reviewed     Procedure Component Value Units Date/Time    POCT pregnancy, urine [[id number]]  (Normal) Resulted: 03/27/23 1244    Lab Status: Final result Specimen: Urine Updated: 03/27/23 1244     EXT Preg Test, Ur Negative     Control Valid    Lipase [[id number]]  (Normal) Collected: 03/27/23 1147    Lab Status: Final result Specimen: Blood from Arm, Right Updated: 03/27/23 1229     Lipase 17 u/L     Comprehensive metabolic panel [[id number]] Collected: 03/27/23 1147    Lab Status: Final result Specimen: Blood from Arm, Right Updated: 03/27/23 1229     Sodium 137 mmol/L      Potassium 3 5 mmol/L      Chloride 104 mmol/L      CO2 25 mmol/L      ANION GAP 8 mmol/L      BUN 14 mg/dL      Creatinine 0 67 mg/dL      Glucose 83 mg/dL      Calcium 9 4 mg/dL      AST 13 U/L      ALT 12 U/L      Alkaline Phosphatase 55 U/L      Total Protein 7 0 g/dL      Albumin 4 3 g/dL      Total Bilirubin 0 54 mg/dL      eGFR 126 ml/min/1 73sq m     Narrative:      [place] guidelines for Chronic Kidney Disease (CKD):   •  Stage 1 with normal or high GFR (GFR > 90 mL/min/1 73 square meters)  •  Stage 2 Mild CKD (GFR = 60-89 mL/min/1 73 square meters)  •  Stage 3A Moderate CKD (GFR = 45-59 mL/min/1 73 square meters)  •  Stage 3B Moderate CKD (GFR = 30-44 mL/min/1 73 square meters)  •  Stage 4 Severe CKD (GFR = 15-29 mL/min/1 73 square meters)  •  Stage 5 End Stage CKD (GFR <15 mL/min/1 73 square meters)  Note: GFR calculation is accurate only with a steady state creatinine    Urine Microscopic [[id number]]  (Abnormal) Collected: 03/27/23 1147    Lab Status: Final result Specimen: Urine, Clean Catch Updated: 03/27/23 1220     RBC, UA Innumerable /hpf      WBC, UA Innumerable /hpf      Epithelial Cells Occasional /hpf      Bacteria, UA Innumerable /hpf      MUCUS THREADS Occasional    Urine culture [[id number]] Collected: 03/27/23 1147    Lab Status:  In process Specimen: Urine, Clean Catch Updated: 03/27/23 1220    UA w Reflex to Microscopic w Reflex to Culture [[id number]]  (Abnormal) Collected: 03/27/23 1147    Lab Status: Final result Specimen: Urine, Clean Catch Updated: 03/27/23 1216     Color, UA Yellow     Clarity, UA Turbid     Specific [place], UA 1 018     pH, UA 6 0     Leukocytes, UA Moderate     Nitrite, UA Negative     Protein,  (2+) mg/dl      Glucose, UA Negative mg/dl      Ketones, UA Negative mg/dl      Urobilinogen, UA <2 0 mg/dl      Bilirubin, UA Negative     Occult Blood, UA Large    CBC and differential [[id number]]  (Abnormal) Collected: 03/27/23 1147    Lab Status: Final result Specimen: Blood from Arm, Right Updated: 03/27/23 1212     WBC 14 32 Thousand/uL      RBC 4 76 Million/uL      Hemoglobin 14 3 g/dL      Hematocrit 42 5 %      MCV 89 fL      MCH 30 0 pg      MCHC 33 6 g/dL      RDW 11 9 %      MPV 10 4 fL      Platelets 602 Thousands/uL      nRBC 0 /100 WBCs      Neutrophils Relative 78 %      Immat GRANS % 1 %      Lymphocytes Relative 14 %      Monocytes Relative 6 %      Eosinophils Relative 1 %      Basophils Relative 0 %      Neutrophils Absolute 11 18 Thousands/µL      Immature Grans Absolute 0 07 Thousand/uL      Lymphocytes Absolute 2 01 Thousands/µL      Monocytes Absolute 0 89 Thousand/µL      Eosinophils Absolute 0 13 Thousand/µL      Basophils Absolute 0 04 Thousands/µL                  CT abdomen pelvis with contrast   Final Result by Rex Adam MD (03/27 1343)      Urinary bladder wall thickening compatible with cystitis  Bilateral ureteritis suggestive of ascending infection  No evidence of pyelonephritis  Workstation performed: [id number]                    Procedures  Procedures         ED Course  ED Course as of 03/27/23 1612   Mon Mar 27, 2023   1301 WBC(!): 14 32   1301 RBC, UA(!): Innumerable   1301 WBC, UA(!): Innumerable   1301 Bacteria, UA(!): Innumerable   1301 Consistent w/ R sided pyelonephritis  Medical Decision Making  81-year-old female coming into the ED for evaluation of a 2-day history of UTI symptoms, dysuria, frequency and bilateral flank pain  She states that she started with UTI symptoms but the flank pain came after  Patient was tender to the right flank and right abdomen on exam   Labs, UA, CT show acute bilateral pyelonephritis without kidney stone present  IV ceftriaxone given in the ED, discharged home on a 10-day course of cefpodoxime  Return to ER precautions discussed  Stable for discharge home and outpatient treatment given she is well-appearing, not septic, tolerating p o  Acute pyelonephritis: acute illness or injury  Amount and/or Complexity of Data Reviewed  Labs: ordered  Decision-making details documented in ED Course  Radiology: ordered  Risk  OTC drugs  Prescription drug management            Disposition  Final diagnoses:   Acute pyelonephritis     Time reflects when diagnosis was documented in both MDM as applicable and the Disposition within this note     Time User Action Codes Description Comment    3/27/2023  2:13 PM Efrain Monreal Add [N10] Acute pyelonephritis       ED Disposition     ED Disposition   Discharge    Condition   Stable    Date/Time   Mon Mar 27, 2023  2:13 PM    Comment   Steven Delgado discharge to home/self care  Follow-up Information     Follow up With Specialties Details Why 7 [address]            Discharge Medication List as of 3/27/2023  2:16 PM      START taking these medications    Details   cefpodoxime (VANTIN) 200 mg tablet Take 1 tablet (200 mg total) by mouth 2 (two) times a day for 9 days, Starting Mon 3/27/2023, Until Wed 4/5/2023, Normal      ondansetron (ZOFRAN-ODT) 4 mg disintegrating tablet Take 1 tablet (4 mg total) by mouth every 6 (six) hours as needed for nausea or vomiting for up to 10 doses, Starting Mon 3/27/2023, Normal      oxyCODONE (Roxicodone) 5 immediate release tablet Take 1 tablet (5 mg total) by mouth every 6 (six) hours as needed for moderate pain for up to 5 doses Max Daily Amount: 20 mg, Starting Mon 3/27/2023, Normal         CONTINUE these medications which have NOT CHANGED    Details   amphetamine-dextroamphetamine (ADDERALL) 20 mg tablet Take 1 tablet by mouth daily, Starting Wed 3/3/2021, Historical Med      Norethin Ace-Eth Estrad-FE (BLISOVI 24 FE PO) Take by mouth, Historical Med             No discharge procedures on file      PDMP Review     None          ED Provider  Electronically Signed by           John Pham DO  03/27/23 8302

## 2023-03-29 LAB — BACTERIA UR CULT: ABNORMAL

## 2023-12-05 ENCOUNTER — APPOINTMENT (RX ONLY)
Dept: URBAN - NONMETROPOLITAN AREA CLINIC 29 | Facility: CLINIC | Age: 23
Setting detail: DERMATOLOGY
End: 2023-12-05

## 2023-12-05 DIAGNOSIS — L42 PITYRIASIS ROSEA: ICD-10-CM | Status: INADEQUATELY CONTROLLED

## 2023-12-05 PROCEDURE — ? PATIENT SPECIFIC COUNSELING

## 2023-12-05 PROCEDURE — 99203 OFFICE O/P NEW LOW 30 MIN: CPT

## 2023-12-05 PROCEDURE — ? COUNSELING

## 2023-12-05 PROCEDURE — ? PRESCRIPTION

## 2023-12-05 PROCEDURE — ? ORDER TESTS

## 2023-12-05 RX ORDER — TRIAMCINOLONE ACETONIDE 1 MG/G
OINTMENT TOPICAL
Qty: 80 | Refills: 1 | Status: ERX | COMMUNITY
Start: 2023-12-05

## 2023-12-05 RX ADMIN — TRIAMCINOLONE ACETONIDE: 1 OINTMENT TOPICAL at 00:00

## 2023-12-05 ASSESSMENT — LOCATION DETAILED DESCRIPTION DERM
LOCATION DETAILED: LEFT INFERIOR MEDIAL UPPER BACK
LOCATION DETAILED: EPIGASTRIC SKIN
LOCATION DETAILED: LEFT MEDIAL SUPERIOR CHEST
LOCATION DETAILED: INFERIOR THORACIC SPINE

## 2023-12-05 ASSESSMENT — LOCATION SIMPLE DESCRIPTION DERM
LOCATION SIMPLE: ABDOMEN
LOCATION SIMPLE: CHEST
LOCATION SIMPLE: LEFT UPPER BACK
LOCATION SIMPLE: UPPER BACK

## 2023-12-05 ASSESSMENT — LOCATION ZONE DERM: LOCATION ZONE: TRUNK

## 2023-12-05 NOTE — PROCEDURE: PATIENT SPECIFIC COUNSELING
Detail Level: Zone
Started 3 weeks ago with one larger patch right breast \\nOver past 10 days increasing number smaller lesions trunk, upper thighs, neck\\nNot itchy\\nHad eczema as child\\n\\n3.5 cm pink patch right breast\\nNumerous 1-2 cm oval,pink plaques most with central colarette scale extensive on trunk, few neck and,proximal extremities \\n\\nDiscussed likely MI. Topical steroid PRN itch. If not completely resolved in 2 months then rv

## 2023-12-05 NOTE — PROCEDURE: ORDER TESTS
Bill For Surgical Tray: no
Expected Date Of Service: 12/05/2023
Billing Type: Third-Party Bill
Performing Laboratory: 0